# Patient Record
Sex: FEMALE | URBAN - METROPOLITAN AREA
[De-identification: names, ages, dates, MRNs, and addresses within clinical notes are randomized per-mention and may not be internally consistent; named-entity substitution may affect disease eponyms.]

---

## 2018-02-05 NOTE — PATIENT DISCUSSION
OCULAR ROSACEA, OU:  PRESCRIBE LOTEMAX BID OU AND  WARM COMPRESSES AND EYELID SCRUBS QD-BID, ARTIFICIAL TEARS BID-QID AND THE DAILY INTAKE OF OMEGA-3 FATTY ACIDS. CONSIDER TOPICAL STEROID, ORAL TETRACYCLINE AND/OR LIPIFLOW FOR EXACERBATIONS. RETURN FOR FOLLOW-UP AS SCHEDULED.

## 2018-02-05 NOTE — PATIENT DISCUSSION
GLAUCOMA SUSPECT, OU : ENLARGED OPTIC NERVE CUPPING AND POS FAM HX. IOPS WNL. OCT STABLE. REPEAT OCT RNFL @ FU.

## 2018-07-25 ENCOUNTER — IMPORTED ENCOUNTER (OUTPATIENT)
Dept: URBAN - METROPOLITAN AREA CLINIC 9 | Facility: CLINIC | Age: 58
End: 2018-07-25

## 2018-07-30 ENCOUNTER — IMPORTED ENCOUNTER (OUTPATIENT)
Dept: URBAN - METROPOLITAN AREA CLINIC 9 | Facility: CLINIC | Age: 58
End: 2018-07-30

## 2018-08-10 ENCOUNTER — IMPORTED ENCOUNTER (OUTPATIENT)
Dept: URBAN - METROPOLITAN AREA CLINIC 9 | Facility: CLINIC | Age: 58
End: 2018-08-10

## 2019-03-19 NOTE — PATIENT DISCUSSION
EPIPHORA, OU: ASSOCIATED WITH DRY EYE SYNDROME. RECOMMEND ORAL OMEGA-3, LID HYGIENE, INCREASED FREQUENCY OF PRESERVATIVE FREE ARTIFICIAL TEARS. AVOID IRRITANTS, TREAT CONCOMITANT OCULAR ALLERGIES AND CONSIDER LIPIFLOW AT FOLLOW UP IF SYMPTOMS PERSIST.

## 2019-03-19 NOTE — PATIENT DISCUSSION
GLAUCOMA SUSPECT, OU : ENLARGED OPTIC NERVE CUPPING AND POS FAM HX. IOPS WNL. OCT STABLE. REPEAT VF IF THERE ARE CHANGES IN RNFL.

## 2019-03-19 NOTE — PATIENT DISCUSSION
New Prescription: Restasis (cyclosporine): dropperette: 0.05% 1 drop twice a day as directed into both eyes 03-

## 2019-07-09 NOTE — PATIENT DISCUSSION
Continue: Restasis (cyclosporine): dropperette: 0.05% 1 drop twice a day as directed into both eyes 03-

## 2019-07-09 NOTE — PATIENT DISCUSSION
K SICCA OU: MUCH IMPROVED ON RESTASIS CONTINUE BID AND  PRESERVATIVE FREE ARTIFICIAL TEARS 4-6X A DAY, OU AND THE DAILY INTAKE OF OMEGA-3 DHA/EPA FATTY ACIDS TO HELP RELIEVE SYMPTOMS. ADD NIGHTLY LUBRICATING OINTMENT OR GEL. RETURN FOR FOLLOW-UP AS SCHEDULED OR SOONER IF SYMPTOMS WORSEN.

## 2020-05-26 ENCOUNTER — IMPORTED ENCOUNTER (OUTPATIENT)
Dept: URBAN - METROPOLITAN AREA CLINIC 9 | Facility: CLINIC | Age: 60
End: 2020-05-26

## 2020-08-14 NOTE — PATIENT DISCUSSION
K SICCA OU: PRESCRIBED PRESERVATIVE FREE ARTIFICIAL TEARS 4-6X A DAY, OU AND THE DAILY INTAKE OF OMEGA-3 DHA/EPA FATTY ACIDS TO HELP RELIEVE SYMPTOMS. ADD NIGHTLY LUBRICATING OINTMENT OR GEL. CONSIDER PUNCTAL PLUGS AND/OR LIPIFLOW TREATMENT NEXT VISIT IF NOT RESPONSIVE OR IF SYMPTOMS PERSIST. RETURN FOR FOLLOW-UP AS SCHEDULED OR SOONER IF SYMPTOMS WORSEN.

## 2020-08-14 NOTE — PATIENT DISCUSSION
GLAUCOMA SUSPECT, OU : ENLARGED OPTIC NERVE CUPPING AND POS FAM HX. IOPS WNL. OCT STABLE. REPEAT VF YEARLY.

## 2021-09-14 NOTE — PATIENT DISCUSSION
exacerbated by environmental factors such as smoke, wind, or prolonged eye use.  Lifestyle habits and environmental factors contributing to dry eyes

## 2021-09-14 NOTE — PATIENT DISCUSSION
the optic nerve.  I have explained that damage to the optic nerve from glaucoma is irreversible and that the available treatments for glaucoma are

## 2021-09-14 NOTE — PATIENT DISCUSSION
Lipiflow, moisture goggles, and lubricating ointments. I stressed the importance of compliance with treatment.

## 2021-09-14 NOTE — PATIENT DISCUSSION
have been discussed.  Treatment options include, but are not limited to, artificial tears, punctal plugs, topical cyclosporine, oral omega-3 supplements,

## 2021-09-14 NOTE — PATIENT DISCUSSION
Cataract Counseling: Not visually significant to proceed with surgery at this time. I have discussed continuing with current spectacles vs updating.  I

## 2021-09-14 NOTE — PATIENT DISCUSSION
systemic illnesses such as Sjogren?s disease or rheumatoid arthritis may contribute to severity.  Vision may be limited by dry eye, and symptoms

## 2021-09-14 NOTE — PATIENT DISCUSSION
Ocular Rosacea Counseling: I have explained the diagnosis of Ocular Rosacea and its pathophysiology.  I have explained to the patient that if left

## 2021-09-14 NOTE — PATIENT DISCUSSION
Dry Eye Syndrome Counseling: I have discussed the diagnosis and the pathophysiology of this disease with the patient.  Eyelid pathology and

## 2021-09-14 NOTE — PATIENT DISCUSSION
Glaucoma Suspect Counseling: I have explained to the patient at length glaucoma potentially causes loss of peripheral vision due to damage to

## 2021-09-14 NOTE — PATIENT DISCUSSION
designed to prevent further damage if we determine glaucoma is present.  I have explained the importance of regular follow-up with dilated eye exams

## 2021-09-14 NOTE — PATIENT DISCUSSION
untreated, ocular rosacea can cause corneal damage from tear film insufficiency or eyelid damage from inflammation, both of which could lead to vision loss. Encouraged patient to avoid exacerbating environmental factors. I instructed the patient on using warm compresses and eyelid scrubs.

## 2021-09-14 NOTE — PATIENT DISCUSSION
have been reviewed with the patient.  Daily prescribed and over the counter medications, along with their potential contributions to dry eye symptoms,

## 2021-09-14 NOTE — PATIENT DISCUSSION
I also instructed the patient on using artificial tears two to four times per day.  Successful management is dependent on patient compliance with the

## 2021-09-14 NOTE — PATIENT DISCUSSION
have offerred the patient a new spectacle prescription to fill if desires. Return to follow up as schedled or sooner if symptoms arise.

## 2021-10-18 ASSESSMENT — VISUAL ACUITY
OD_CC: 20/20 SN
OS_CC: 20/20 - SN
OS_CC: 14.2
OD_SC: 20/60 + SN
OD_CC: 20/20 SN
OD_CC: 20/20 SN
OD_CC: 20/20 - SN
OS_SC: 20/50 SN
OS_CC: 20/20 - SN
OS_CC: 20/25 SN
OD_CC: 14.2
OS_CC: 20/20 - SN
OD_CC: 20/20 - SN

## 2021-10-18 ASSESSMENT — KERATOMETRY
OS_AXISANGLE2_DEGREES: 119
OS_K2POWER_DIOPTERS: 44.75
OD_K1POWER_DIOPTERS: 44
OD_AXISANGLE2_DEGREES: 47
OS_AXISANGLE2_DEGREES: 143
OD_K2POWER_DIOPTERS: 44.75
OD_K2POWER_DIOPTERS: 44.75
OD_K1POWER_DIOPTERS: 44.25
OS_K1POWER_DIOPTERS: 44
OS_K1POWER_DIOPTERS: 44.5
OD_AXISANGLE_DEGREES: 141
OD_AXISANGLE2_DEGREES: 51
OD_AXISANGLE_DEGREES: 137
OS_AXISANGLE_DEGREES: 53
OS_AXISANGLE_DEGREES: 29
OS_K2POWER_DIOPTERS: 45

## 2021-10-18 ASSESSMENT — TONOMETRY
OD_IOP_MMHG: 7
OS_IOP_MMHG: 14
OS_IOP_MMHG: 6
OD_IOP_MMHG: 15

## 2022-06-19 RX ORDER — DICLOFENAC SODIUM 75 MG/1
TABLET, DELAYED RELEASE ORAL
COMMUNITY

## 2022-11-21 ENCOUNTER — ESTABLISHED PATIENT (OUTPATIENT)
Dept: URBAN - METROPOLITAN AREA CLINIC 9 | Facility: CLINIC | Age: 62
End: 2022-11-21

## 2022-11-21 DIAGNOSIS — L71.9: ICD-10-CM

## 2022-11-21 DIAGNOSIS — H04.123: ICD-10-CM

## 2022-11-21 DIAGNOSIS — H35.363: ICD-10-CM

## 2022-11-21 DIAGNOSIS — H11.153: ICD-10-CM

## 2022-11-21 DIAGNOSIS — H25.13: ICD-10-CM

## 2022-11-21 PROCEDURE — 92015 DETERMINE REFRACTIVE STATE: CPT

## 2022-11-21 PROCEDURE — 92014 COMPRE OPH EXAM EST PT 1/>: CPT

## 2022-11-21 PROCEDURE — 92134 CPTRZ OPH DX IMG PST SGM RTA: CPT

## 2022-11-21 ASSESSMENT — TONOMETRY
OS_IOP_MMHG: 14
OD_IOP_MMHG: 14

## 2022-11-21 ASSESSMENT — VISUAL ACUITY
OS_GLARE: 20/30
OU_SC: 20/25+2
OD_SC: 20/30
OS_SC: 20/30
OD_GLARE: 20/30